# Patient Record
Sex: FEMALE | Race: WHITE | Employment: OTHER | URBAN - METROPOLITAN AREA
[De-identification: names, ages, dates, MRNs, and addresses within clinical notes are randomized per-mention and may not be internally consistent; named-entity substitution may affect disease eponyms.]

---

## 2017-05-25 PROBLEM — Z72.0 TOBACCO USE: Status: ACTIVE | Noted: 2017-05-25

## 2017-05-25 PROBLEM — Z12.39 BREAST CANCER SCREENING: Status: ACTIVE | Noted: 2017-05-25

## 2017-05-25 PROBLEM — I25.10 CORONARY ARTERY DISEASE INVOLVING NATIVE CORONARY ARTERY: Status: ACTIVE | Noted: 2017-05-25

## 2017-05-25 PROBLEM — H61.21 RIGHT EAR IMPACTED CERUMEN: Status: ACTIVE | Noted: 2017-05-25

## 2017-05-25 PROBLEM — Z12.11 COLON CANCER SCREENING: Status: ACTIVE | Noted: 2017-05-25

## 2017-05-25 PROBLEM — Z78.0 POST-MENOPAUSAL: Status: ACTIVE | Noted: 2017-05-25

## 2017-06-11 ENCOUNTER — APPOINTMENT (OUTPATIENT)
Dept: CT IMAGING | Age: 67
End: 2017-06-11
Attending: EMERGENCY MEDICINE
Payer: MEDICARE

## 2017-06-11 ENCOUNTER — HOSPITAL ENCOUNTER (EMERGENCY)
Age: 67
Discharge: HOME | End: 2017-06-11
Attending: EMERGENCY MEDICINE
Payer: MEDICARE

## 2017-06-11 VITALS
BODY MASS INDEX: 18.99 KG/M2 | HEIGHT: 67 IN | SYSTOLIC BLOOD PRESSURE: 154 MMHG | OXYGEN SATURATION: 94 % | DIASTOLIC BLOOD PRESSURE: 66 MMHG | WEIGHT: 121 LBS | HEART RATE: 80 BPM | RESPIRATION RATE: 18 BRPM | TEMPERATURE: 98.2 F

## 2017-06-11 DIAGNOSIS — R10.31 ABDOMINAL PAIN, RIGHT LOWER QUADRANT: Primary | ICD-10-CM

## 2017-06-11 LAB
ALBUMIN SERPL BCP-MCNC: 4 G/DL (ref 3.2–4.6)
ALBUMIN/GLOB SERPL: 1 {RATIO} (ref 1.2–3.5)
ALP SERPL-CCNC: 56 U/L (ref 50–136)
ALT SERPL-CCNC: 21 U/L (ref 12–65)
ANION GAP BLD CALC-SCNC: 10 MMOL/L (ref 7–16)
AST SERPL W P-5'-P-CCNC: 23 U/L (ref 15–37)
BILIRUB SERPL-MCNC: 0.3 MG/DL (ref 0.2–1.1)
BUN SERPL-MCNC: 12 MG/DL (ref 8–23)
CALCIUM SERPL-MCNC: 9.9 MG/DL (ref 8.3–10.4)
CHLORIDE SERPL-SCNC: 106 MMOL/L (ref 98–107)
CO2 SERPL-SCNC: 28 MMOL/L (ref 21–32)
CREAT SERPL-MCNC: 0.76 MG/DL (ref 0.6–1)
GLOBULIN SER CALC-MCNC: 4.2 G/DL (ref 2.3–3.5)
GLUCOSE SERPL-MCNC: 84 MG/DL (ref 65–100)
POTASSIUM SERPL-SCNC: 4.3 MMOL/L (ref 3.5–5.1)
PROT SERPL-MCNC: 8.2 G/DL (ref 6.3–8.2)
SODIUM SERPL-SCNC: 144 MMOL/L (ref 136–145)

## 2017-06-11 PROCEDURE — 80053 COMPREHEN METABOLIC PANEL: CPT | Performed by: EMERGENCY MEDICINE

## 2017-06-11 PROCEDURE — 74011000258 HC RX REV CODE- 258: Performed by: EMERGENCY MEDICINE

## 2017-06-11 PROCEDURE — 74177 CT ABD & PELVIS W/CONTRAST: CPT

## 2017-06-11 PROCEDURE — 74011636320 HC RX REV CODE- 636/320: Performed by: EMERGENCY MEDICINE

## 2017-06-11 PROCEDURE — 99284 EMERGENCY DEPT VISIT MOD MDM: CPT | Performed by: EMERGENCY MEDICINE

## 2017-06-11 RX ORDER — ONDANSETRON 4 MG/1
4 TABLET, ORALLY DISINTEGRATING ORAL
Qty: 20 TAB | Refills: 0 | Status: SHIPPED | OUTPATIENT
Start: 2017-06-11

## 2017-06-11 RX ORDER — OXYCODONE AND ACETAMINOPHEN 5; 325 MG/1; MG/1
1 TABLET ORAL
Qty: 10 TAB | Refills: 0 | Status: SHIPPED | OUTPATIENT
Start: 2017-06-11 | End: 2017-07-18

## 2017-06-11 RX ORDER — SODIUM CHLORIDE 0.9 % (FLUSH) 0.9 %
10 SYRINGE (ML) INJECTION
Status: COMPLETED | OUTPATIENT
Start: 2017-06-11 | End: 2017-06-11

## 2017-06-11 RX ADMIN — IOPAMIDOL 100 ML: 755 INJECTION, SOLUTION INTRAVENOUS at 15:46

## 2017-06-11 RX ADMIN — Medication 10 ML: at 15:46

## 2017-06-11 RX ADMIN — SODIUM CHLORIDE 100 ML: 900 INJECTION, SOLUTION INTRAVENOUS at 15:46

## 2017-06-11 RX ADMIN — DIATRIZOATE MEGLUMINE AND DIATRIZOATE SODIUM 30 ML: 600; 100 SOLUTION ORAL; RECTAL at 14:52

## 2017-06-11 NOTE — ED PROVIDER NOTES
HPI Comments: 75 yo female with no significant past medical history presents to ED with 5 days of RLQ abdominal pain, nausea, decreased appetite, and subjective fever. She also reports diarrhea x 1. She was seen at urgent care and sent to ED for evaluation. UA was negative and WBC with normal WBC at urgent care per records. She denies urinary symptoms. Previous abdominal surgery of tubal ligation. Patient is a 74 y.o. female presenting with abdominal pain. The history is provided by the patient and medical records. Abdominal Pain    Associated symptoms include a fever, diarrhea and nausea. Pertinent negatives include no dysuria, no hematuria, no arthralgias and no chest pain. History reviewed. No pertinent past medical history. Past Surgical History:   Procedure Laterality Date    HX HEART CATHETERIZATION      minimal coronary disease         Family History:   Problem Relation Age of Onset    COPD Mother     Cancer Father     Breast Cancer Sister     No Known Problems Brother     No Known Problems Brother     No Known Problems Sister        Social History     Social History    Marital status:      Spouse name: N/A    Number of children: N/A    Years of education: N/A     Occupational History    Not on file. Social History Main Topics    Smoking status: Current Every Day Smoker     Packs/day: 0.50     Types: Cigarettes    Smokeless tobacco: Not on file    Alcohol use No    Drug use: Not on file    Sexual activity: Yes     Partners: Male     Other Topics Concern    Not on file     Social History Narrative         ALLERGIES: Review of patient's allergies indicates no known allergies. Review of Systems   Constitutional: Positive for appetite change and fever. Negative for chills and fatigue. HENT: Negative for congestion, rhinorrhea and sore throat. Eyes: Negative for pain and discharge. Respiratory: Negative for chest tightness and shortness of breath. Cardiovascular: Negative for chest pain, palpitations and leg swelling. Gastrointestinal: Positive for abdominal pain, diarrhea and nausea. Endocrine: Negative for cold intolerance and heat intolerance. Genitourinary: Negative for dysuria, flank pain and hematuria. Musculoskeletal: Negative for arthralgias, joint swelling and neck stiffness. Skin: Negative for pallor and wound. Allergic/Immunologic: Negative for environmental allergies. Neurological: Negative for dizziness, syncope, speech difficulty, weakness, light-headedness and numbness. Hematological: Negative for adenopathy. Psychiatric/Behavioral: Negative for suicidal ideas. Vitals:    06/11/17 1357   BP: 125/57   Pulse: 72   Resp: 16   Temp: 98.2 °F (36.8 °C)   SpO2: 95%   Weight: 54.9 kg (121 lb)   Height: 5' 7\" (1.702 m)            Physical Exam   Constitutional: She is oriented to person, place, and time. She appears well-developed and well-nourished. No distress. HENT:   Head: Normocephalic and atraumatic. Eyes: Conjunctivae and EOM are normal.   Neck: Normal range of motion. Neck supple. No tracheal deviation present. Cardiovascular: Normal rate, regular rhythm and intact distal pulses. Pulmonary/Chest: Effort normal and breath sounds normal. No respiratory distress. Abdominal: Soft.   + tenderness with palpation of RLQ, no peritoneal signs   Musculoskeletal: Normal range of motion. She exhibits no edema or tenderness. Neurological: She is alert and oriented to person, place, and time. She has normal strength. No cranial nerve deficit or sensory deficit. Skin: Skin is warm and dry. She is not diaphoretic. Psychiatric: She has a normal mood and affect. MDM  Number of Diagnoses or Management Options  Diagnosis management comments: Labs unremarkable. CT A/P with evidence of appendicitis or acute abnormality. Patient resting comfortably, nontoxic appearing.   Plan for symptom control, outpatient follow up.    ED Course       Procedures

## 2017-06-11 NOTE — ED TRIAGE NOTES
Pt arrives complaining of right lower abdominal pain. States that she was seen at urgent care and was sent here for a CT for possible appendicitis. Pt states her urine was run and was found to be clean. Pt states this pain started last Tuesday. Pt states she's had diarrhea, denies vomiting. States she does have nausea. Pt alert and oriented in triage.

## 2017-06-11 NOTE — DISCHARGE INSTRUCTIONS

## 2017-06-13 PROBLEM — R10.31 RLQ ABDOMINAL PAIN: Status: ACTIVE | Noted: 2017-06-13

## 2017-06-27 ENCOUNTER — HOSPITAL ENCOUNTER (OUTPATIENT)
Dept: LAB | Age: 67
Discharge: HOME | End: 2017-06-27

## 2017-06-27 PROCEDURE — 88305 TISSUE EXAM BY PATHOLOGIST: CPT | Performed by: INTERNAL MEDICINE

## 2017-07-03 ENCOUNTER — HOSPITAL ENCOUNTER (OUTPATIENT)
Dept: ULTRASOUND IMAGING | Age: 67
Discharge: HOME | End: 2017-07-03
Attending: SURGERY
Payer: MEDICARE

## 2017-07-03 ENCOUNTER — HOSPITAL ENCOUNTER (OUTPATIENT)
Dept: NUCLEAR MEDICINE | Age: 67
Discharge: HOME | End: 2017-07-03
Attending: SURGERY
Payer: MEDICARE

## 2017-07-03 VITALS — WEIGHT: 119 LBS | HEIGHT: 67 IN | BODY MASS INDEX: 18.68 KG/M2

## 2017-07-03 DIAGNOSIS — R10.9 RIGHT SIDED ABDOMINAL PAIN: ICD-10-CM

## 2017-07-03 PROCEDURE — 78227 HEPATOBIL SYST IMAGE W/DRUG: CPT

## 2017-07-03 PROCEDURE — 74011250636 HC RX REV CODE- 250/636: Performed by: SURGERY

## 2017-07-03 PROCEDURE — 76705 ECHO EXAM OF ABDOMEN: CPT

## 2017-07-03 RX ADMIN — SINCALIDE 1.08 MCG: 5 INJECTION, POWDER, LYOPHILIZED, FOR SOLUTION INTRAVENOUS at 09:00

## 2017-07-18 PROBLEM — Z01.419 VISIT FOR PELVIC EXAM: Status: ACTIVE | Noted: 2017-07-18

## 2017-08-15 ENCOUNTER — HOSPITAL ENCOUNTER (OUTPATIENT)
Dept: MAMMOGRAPHY | Age: 67
Discharge: HOME | End: 2017-08-15
Attending: INTERNAL MEDICINE
Payer: MEDICARE

## 2017-08-15 DIAGNOSIS — Z12.39 BREAST CANCER SCREENING: ICD-10-CM

## 2017-08-15 DIAGNOSIS — Z78.0 POST-MENOPAUSAL: ICD-10-CM

## 2017-08-15 PROCEDURE — 77067 SCR MAMMO BI INCL CAD: CPT

## 2017-08-15 PROCEDURE — 77080 DXA BONE DENSITY AXIAL: CPT

## 2018-01-30 PROBLEM — J01.00 ACUTE NON-RECURRENT MAXILLARY SINUSITIS: Status: ACTIVE | Noted: 2018-01-30

## 2019-03-18 PROBLEM — R53.83 OTHER FATIGUE: Status: ACTIVE | Noted: 2019-03-18

## 2019-03-18 PROBLEM — K63.5 HYPERPLASTIC COLONIC POLYP: Status: ACTIVE | Noted: 2019-03-18

## 2019-03-18 PROBLEM — R63.5 WEIGHT GAIN: Status: ACTIVE | Noted: 2019-03-18

## 2019-03-18 PROBLEM — G47.9 SLEEP DISTURBANCE: Status: ACTIVE | Noted: 2019-03-18

## 2019-07-19 ENCOUNTER — HOSPITAL ENCOUNTER (OUTPATIENT)
Dept: MAMMOGRAPHY | Age: 69
Discharge: HOME | End: 2019-07-19
Attending: INTERNAL MEDICINE
Payer: MEDICARE

## 2019-07-19 DIAGNOSIS — Z12.39 BREAST CANCER SCREENING: ICD-10-CM

## 2019-07-19 PROCEDURE — 77067 SCR MAMMO BI INCL CAD: CPT

## 2019-09-25 PROBLEM — Z01.419 VISIT FOR PELVIC EXAM: Status: RESOLVED | Noted: 2017-07-18 | Resolved: 2019-09-25

## 2020-01-28 PROBLEM — R06.02 SOB (SHORTNESS OF BREATH): Status: ACTIVE | Noted: 2020-01-28

## 2020-01-28 PROBLEM — M54.16 LUMBAR RADICULOPATHY: Status: ACTIVE | Noted: 2020-01-28

## 2020-01-28 PROBLEM — R63.4 WEIGHT LOSS: Status: ACTIVE | Noted: 2020-01-28

## 2020-03-02 ENCOUNTER — HOSPITAL ENCOUNTER (OUTPATIENT)
Dept: MRI IMAGING | Age: 70
Discharge: HOME | End: 2020-03-02
Attending: INTERNAL MEDICINE
Payer: MEDICARE

## 2020-03-02 DIAGNOSIS — M51.36 DDD (DEGENERATIVE DISC DISEASE), LUMBAR: ICD-10-CM

## 2020-03-02 PROCEDURE — 72148 MRI LUMBAR SPINE W/O DYE: CPT

## 2020-03-03 NOTE — PROGRESS NOTES
MRI confirms severe spinal stenosis.  Pt needs referral to see if she is a candidate for [facility] ( Ortho, or Neurosurgery, or Pain Management) wherever she can get in the soonest

## 2020-03-05 NOTE — PROGRESS NOTES
Pt notified per Dr Bri Zamorano review below. REferral to Ortho done.  Pt to call if she does not hear

## 2021-05-12 LAB — MAMMOGRAPHY, EXTERNAL: NORMAL

## 2022-03-18 PROBLEM — H61.21 RIGHT EAR IMPACTED CERUMEN: Status: ACTIVE | Noted: 2017-05-25

## 2022-03-18 PROBLEM — R53.83 OTHER FATIGUE: Status: ACTIVE | Noted: 2019-03-18

## 2022-03-19 PROBLEM — R63.4 WEIGHT LOSS: Status: ACTIVE | Noted: 2020-01-28

## 2022-03-19 PROBLEM — R63.5 WEIGHT GAIN: Status: ACTIVE | Noted: 2019-03-18

## 2022-03-19 PROBLEM — J01.00 ACUTE NON-RECURRENT MAXILLARY SINUSITIS: Status: ACTIVE | Noted: 2018-01-30

## 2022-03-19 PROBLEM — Z78.0 POST-MENOPAUSAL: Status: ACTIVE | Noted: 2017-05-25

## 2022-03-19 PROBLEM — M54.16 LUMBAR RADICULOPATHY: Status: ACTIVE | Noted: 2020-01-28

## 2022-03-19 PROBLEM — Z12.11 COLON CANCER SCREENING: Status: ACTIVE | Noted: 2017-05-25

## 2022-03-19 PROBLEM — I25.10 CORONARY ARTERY DISEASE INVOLVING NATIVE CORONARY ARTERY: Status: ACTIVE | Noted: 2017-05-25

## 2022-03-19 PROBLEM — R06.02 SOB (SHORTNESS OF BREATH): Status: ACTIVE | Noted: 2020-01-28

## 2022-03-19 PROBLEM — K63.5 HYPERPLASTIC COLONIC POLYP: Status: ACTIVE | Noted: 2019-03-18

## 2022-03-19 PROBLEM — Z72.0 TOBACCO USE: Status: ACTIVE | Noted: 2017-05-25

## 2022-03-20 PROBLEM — G47.9 SLEEP DISTURBANCE: Status: ACTIVE | Noted: 2019-03-18

## 2022-03-20 PROBLEM — Z12.39 BREAST CANCER SCREENING: Status: ACTIVE | Noted: 2017-05-25

## 2022-03-20 PROBLEM — R10.31 RLQ ABDOMINAL PAIN: Status: ACTIVE | Noted: 2017-06-13

## 2023-02-21 ENCOUNTER — OFFICE VISIT (OUTPATIENT)
Dept: INTERNAL MEDICINE CLINIC | Facility: CLINIC | Age: 73
End: 2023-02-21
Payer: MEDICARE

## 2023-02-21 VITALS
HEIGHT: 67 IN | BODY MASS INDEX: 17.55 KG/M2 | OXYGEN SATURATION: 96 % | WEIGHT: 111.8 LBS | HEART RATE: 92 BPM | DIASTOLIC BLOOD PRESSURE: 62 MMHG | SYSTOLIC BLOOD PRESSURE: 100 MMHG

## 2023-02-21 DIAGNOSIS — Z11.9 SCREENING EXAMINATION FOR INFECTIOUS DISEASE: ICD-10-CM

## 2023-02-21 DIAGNOSIS — M81.0 AGE-RELATED OSTEOPOROSIS WITHOUT CURRENT PATHOLOGICAL FRACTURE: ICD-10-CM

## 2023-02-21 DIAGNOSIS — Z78.0 POSTMENOPAUSAL: ICD-10-CM

## 2023-02-21 DIAGNOSIS — R91.1 PULMONARY NODULE: ICD-10-CM

## 2023-02-21 DIAGNOSIS — J42 CHRONIC BRONCHITIS, UNSPECIFIED CHRONIC BRONCHITIS TYPE (HCC): Primary | ICD-10-CM

## 2023-02-21 DIAGNOSIS — I25.10 CORONARY ARTERY DISEASE INVOLVING NATIVE CORONARY ARTERY OF NATIVE HEART WITHOUT ANGINA PECTORIS: ICD-10-CM

## 2023-02-21 DIAGNOSIS — Z72.0 TOBACCO USE: ICD-10-CM

## 2023-02-21 DIAGNOSIS — K63.5 HYPERPLASTIC COLONIC POLYP, UNSPECIFIED PART OF COLON: ICD-10-CM

## 2023-02-21 DIAGNOSIS — E78.2 MIXED HYPERLIPIDEMIA: ICD-10-CM

## 2023-02-21 PROBLEM — R06.02 SOB (SHORTNESS OF BREATH): Status: RESOLVED | Noted: 2020-01-28 | Resolved: 2023-02-21

## 2023-02-21 PROBLEM — M54.16 LUMBAR RADICULOPATHY: Status: ACTIVE | Noted: 2020-01-28

## 2023-02-21 PROBLEM — Z12.39 BREAST CANCER SCREENING: Status: RESOLVED | Noted: 2017-05-25 | Resolved: 2023-02-21

## 2023-02-21 PROBLEM — J44.9 COPD (CHRONIC OBSTRUCTIVE PULMONARY DISEASE) (HCC): Status: ACTIVE | Noted: 2020-06-23

## 2023-02-21 PROBLEM — Z12.11 COLON CANCER SCREENING: Status: RESOLVED | Noted: 2017-05-25 | Resolved: 2023-02-21

## 2023-02-21 PROBLEM — R10.31 RLQ ABDOMINAL PAIN: Status: RESOLVED | Noted: 2017-06-13 | Resolved: 2023-02-21

## 2023-02-21 PROBLEM — R53.83 OTHER FATIGUE: Status: RESOLVED | Noted: 2019-03-18 | Resolved: 2023-02-21

## 2023-02-21 PROBLEM — R63.4 WEIGHT LOSS: Status: RESOLVED | Noted: 2020-01-28 | Resolved: 2023-02-21

## 2023-02-21 PROBLEM — M54.16 LUMBAR RADICULOPATHY: Status: RESOLVED | Noted: 2020-01-28 | Resolved: 2023-02-21

## 2023-02-21 PROBLEM — J01.00 ACUTE NON-RECURRENT MAXILLARY SINUSITIS: Status: RESOLVED | Noted: 2018-01-30 | Resolved: 2023-02-21

## 2023-02-21 PROBLEM — R63.5 WEIGHT GAIN: Status: RESOLVED | Noted: 2019-03-18 | Resolved: 2023-02-21

## 2023-02-21 PROBLEM — H61.21 RIGHT EAR IMPACTED CERUMEN: Status: RESOLVED | Noted: 2017-05-25 | Resolved: 2023-02-21

## 2023-02-21 LAB
25(OH)D3 SERPL-MCNC: 25.6 NG/ML (ref 30–100)
ALBUMIN SERPL-MCNC: 4.2 G/DL (ref 3.2–4.6)
ALBUMIN/GLOB SERPL: 1.4 (ref 0.4–1.6)
ALP SERPL-CCNC: 47 U/L (ref 50–136)
ALT SERPL-CCNC: 15 U/L (ref 12–65)
ANION GAP SERPL CALC-SCNC: 3 MMOL/L (ref 2–11)
AST SERPL-CCNC: 20 U/L (ref 15–37)
BASOPHILS # BLD: 0.1 K/UL (ref 0–0.2)
BASOPHILS NFR BLD: 1 % (ref 0–2)
BILIRUB SERPL-MCNC: 0.4 MG/DL (ref 0.2–1.1)
BUN SERPL-MCNC: 23 MG/DL (ref 8–23)
CALCIUM SERPL-MCNC: 10 MG/DL (ref 8.3–10.4)
CHLORIDE SERPL-SCNC: 107 MMOL/L (ref 101–110)
CHOLEST SERPL-MCNC: 278 MG/DL
CO2 SERPL-SCNC: 26 MMOL/L (ref 21–32)
CREAT SERPL-MCNC: 0.9 MG/DL (ref 0.6–1)
DIFFERENTIAL METHOD BLD: NORMAL
EOSINOPHIL # BLD: 0.1 K/UL (ref 0–0.8)
EOSINOPHIL NFR BLD: 1 % (ref 0.5–7.8)
ERYTHROCYTE [DISTWIDTH] IN BLOOD BY AUTOMATED COUNT: 14 % (ref 11.9–14.6)
GLOBULIN SER CALC-MCNC: 3 G/DL (ref 2.8–4.5)
GLUCOSE SERPL-MCNC: 97 MG/DL (ref 65–100)
HCT VFR BLD AUTO: 39.9 % (ref 35.8–46.3)
HDLC SERPL-MCNC: 99 MG/DL (ref 40–60)
HDLC SERPL: 2.8
HGB BLD-MCNC: 12.8 G/DL (ref 11.7–15.4)
IMM GRANULOCYTES # BLD AUTO: 0 K/UL (ref 0–0.5)
IMM GRANULOCYTES NFR BLD AUTO: 0 % (ref 0–5)
LDLC SERPL CALC-MCNC: 158 MG/DL
LYMPHOCYTES # BLD: 1.6 K/UL (ref 0.5–4.6)
LYMPHOCYTES NFR BLD: 21 % (ref 13–44)
MCH RBC QN AUTO: 29.7 PG (ref 26.1–32.9)
MCHC RBC AUTO-ENTMCNC: 32.1 G/DL (ref 31.4–35)
MCV RBC AUTO: 92.6 FL (ref 82–102)
MONOCYTES # BLD: 0.4 K/UL (ref 0.1–1.3)
MONOCYTES NFR BLD: 5 % (ref 4–12)
NEUTS SEG # BLD: 5.4 K/UL (ref 1.7–8.2)
NEUTS SEG NFR BLD: 72 % (ref 43–78)
NRBC # BLD: 0 K/UL (ref 0–0.2)
PLATELET # BLD AUTO: 306 K/UL (ref 150–450)
PMV BLD AUTO: 10.9 FL (ref 9.4–12.3)
POTASSIUM SERPL-SCNC: 4.4 MMOL/L (ref 3.5–5.1)
PROT SERPL-MCNC: 7.2 G/DL (ref 6.3–8.2)
RBC # BLD AUTO: 4.31 M/UL (ref 4.05–5.2)
SODIUM SERPL-SCNC: 136 MMOL/L (ref 133–143)
TRIGL SERPL-MCNC: 105 MG/DL (ref 35–150)
TSH W FREE THYROID IF ABNORMAL: 2.57 UIU/ML (ref 0.36–3.74)
VLDLC SERPL CALC-MCNC: 21 MG/DL (ref 6–23)
WBC # BLD AUTO: 7.6 K/UL (ref 4.3–11.1)

## 2023-02-21 PROCEDURE — G8399 PT W/DXA RESULTS DOCUMENT: HCPCS | Performed by: INTERNAL MEDICINE

## 2023-02-21 PROCEDURE — 3023F SPIROM DOC REV: CPT | Performed by: INTERNAL MEDICINE

## 2023-02-21 PROCEDURE — 1090F PRES/ABSN URINE INCON ASSESS: CPT | Performed by: INTERNAL MEDICINE

## 2023-02-21 PROCEDURE — G8427 DOCREV CUR MEDS BY ELIG CLIN: HCPCS | Performed by: INTERNAL MEDICINE

## 2023-02-21 PROCEDURE — G8484 FLU IMMUNIZE NO ADMIN: HCPCS | Performed by: INTERNAL MEDICINE

## 2023-02-21 PROCEDURE — 99406 BEHAV CHNG SMOKING 3-10 MIN: CPT | Performed by: INTERNAL MEDICINE

## 2023-02-21 PROCEDURE — 4004F PT TOBACCO SCREEN RCVD TLK: CPT | Performed by: INTERNAL MEDICINE

## 2023-02-21 PROCEDURE — 1123F ACP DISCUSS/DSCN MKR DOCD: CPT | Performed by: INTERNAL MEDICINE

## 2023-02-21 PROCEDURE — 3017F COLORECTAL CA SCREEN DOC REV: CPT | Performed by: INTERNAL MEDICINE

## 2023-02-21 PROCEDURE — G8419 CALC BMI OUT NRM PARAM NOF/U: HCPCS | Performed by: INTERNAL MEDICINE

## 2023-02-21 PROCEDURE — 99204 OFFICE O/P NEW MOD 45 MIN: CPT | Performed by: INTERNAL MEDICINE

## 2023-02-21 RX ORDER — ALBUTEROL SULFATE 90 UG/1
1 AEROSOL, METERED RESPIRATORY (INHALATION) EVERY 6 HOURS PRN
Qty: 3 EACH | Refills: 2 | Status: SHIPPED | OUTPATIENT
Start: 2023-02-21

## 2023-02-21 ASSESSMENT — ENCOUNTER SYMPTOMS
COUGH: 0
ABDOMINAL PAIN: 0
SHORTNESS OF BREATH: 0
CHEST TIGHTNESS: 0
ABDOMINAL DISTENTION: 0
CONSTIPATION: 0

## 2023-02-21 ASSESSMENT — PATIENT HEALTH QUESTIONNAIRE - PHQ9
SUM OF ALL RESPONSES TO PHQ9 QUESTIONS 1 & 2: 0
1. LITTLE INTEREST OR PLEASURE IN DOING THINGS: 0
2. FEELING DOWN, DEPRESSED OR HOPELESS: 0
SUM OF ALL RESPONSES TO PHQ QUESTIONS 1-9: 0

## 2023-02-21 ASSESSMENT — ANXIETY QUESTIONNAIRES
3. WORRYING TOO MUCH ABOUT DIFFERENT THINGS: 0
1. FEELING NERVOUS, ANXIOUS, OR ON EDGE: 0
7. FEELING AFRAID AS IF SOMETHING AWFUL MIGHT HAPPEN: 0
4. TROUBLE RELAXING: 0
GAD7 TOTAL SCORE: 0
6. BECOMING EASILY ANNOYED OR IRRITABLE: 0
5. BEING SO RESTLESS THAT IT IS HARD TO SIT STILL: 0
2. NOT BEING ABLE TO STOP OR CONTROL WORRYING: 0

## 2023-02-21 NOTE — PROGRESS NOTES
Chief Complaint   Patient presents with    Establish Care     Last AWV  1/13/23. Daniel Ramos is a 81 y.o. female who presents today for Establish Care (Last AWV  1/13/23. )     Patient is here to [facility], she lives with her , had 7 kids, but unfortunately 1 passed away. She is originally from [place], has been living in [place] for at least 15 years, few her kids live in [place]  She reports being active at home, she likes to use a treadmill,  Continues to smoke about 1 pack it can last over a month. Her providers are mainly with [facility], but some of them had left the practice so she decided to change primary care    Getting a mammogram coming up in the next 2 months  Due for bone density, has a history of osteoporosis not on current treatment    She has opted in for screening for lung cancer, the last CT scan was showing a new 5 mm nodule, and is having a follow-up scan in April. Currently due for bone density    History of COPD using her inhalers as prescribed, last time she has used the albuterol was couple months ago    Reports compliance    Per her chart she has history of coronary artery disease, but was found to have nonocclusive coronary artery disease,        Wt Readings from Last 3 Encounters:   02/21/23 111 lb 12.8 oz (50.7 kg)     Vitals:    02/21/23 1052   BP: 100/62   Site: Left Upper Arm   Position: Sitting   Pulse: 92   SpO2: 96%   Weight: 111 lb 12.8 oz (50.7 kg)   Height: 5' 7\" (1.702 m)        Assessment and plan:  1. Chronic bronchitis, unspecified chronic bronchitis type ([address]  Assessment & Plan:   At goal, continue current medications, medication adherence emphasized and smoking cesation   Orders:  -     albuterol sulfate HFA (PROVENTIL;VENTOLIN;PROAIR) 108 (90 Base) MCG/ACT inhaler;  Inhale 1 puff into the lungs every 6 hours as needed for Wheezing INHALE 1 PUFF BY MOUTH EVERY 6 HOURS AS NEEDED FOR WHEEZING, Disp-3 each, R-2Normal  - tiotropium-olodaterol (STIOLTO) 2.5-2.5 MCG/ACT AERS; Inhale 2 puffs into the lungs daily, Disp-3 each, R-2Normal  2. Postmenopausal  -     DEXA BONE DENSITY AXIAL SKELETON; Future  -     Vitamin D 25 Hydroxy; Future  3. Tobacco use  Assessment & Plan:     Tobacco cessation was discussed with patient. Patient was instructed about harms of smoking, benefits of quitting. Possible alternatives like counseling, coaching programs, support of friends and family, and/or medications  At least 3-5 minutes spend in counseling. 4. Pulmonary nodule  Assessment & Plan:   Asymptomatic, CT pending ,  5. Age-related osteoporosis without current pathological fracture  -     Vitamin D 25 Hydroxy; Future  6. Mixed hyperlipidemia  -     CBC with Auto Differential; Future  -     Comprehensive Metabolic Panel; Future  -     Lipid Panel; Future  -     TSH with Reflex; Future  7. Screening examination for infectious disease  -     Hepatitis C Ab, Rflx to Qt by PCR; Future  8. Coronary artery disease involving native coronary artery of native heart without angina pectoris  Assessment & Plan:   Asymptomatic, lifestyle modifications recommended and smoking cessation  9. Hyperplastic colonic polyp, unspecified part of colon  Assessment & Plan:   Asymptomatic, next colonsocopy 2027  Records available in care everywhere were reviewed, more than 30 to 40 minutes were spent, reviewing and updating current chart. Return in about 6 months (around 8/21/2023). Review of system:    Review of Systems   Constitutional:  Negative for activity change, chills, fatigue and fever. Respiratory:  Negative for cough, chest tightness and shortness of breath. Cardiovascular:  Negative for chest pain. Gastrointestinal:  Negative for abdominal distention, abdominal pain and constipation. Neurological:  Negative for dizziness and headaches. Psychiatric/Behavioral:  The patient is not nervous/anxious.         Immunization history:    Immunization History   Administered Date(s) Administered    COVID-19, J&J, (age 18y+), IM, 0.5 mL 03/08/2021, 11/10/2021    Influenza, FLUZONE (age 72 y+), High Dose, 0.7mL 10/15/2020, 03/08/2022, 11/04/2022    Influenza, High Dose (Fluzone 65 yrs and older) 11/18/2018    Influenza, Triv, inactivated, subunit, adjuvanted, IM (Fluad 65 yrs and older) 11/11/2019       Current medications:      Current Outpatient Medications:     Cholecalciferol 50 MCG (2000 UT) CAPS, Take by mouth daily, Disp: , Rfl:     albuterol sulfate HFA (PROVENTIL;VENTOLIN;PROAIR) 108 (90 Base) MCG/ACT inhaler, Inhale 1 puff into the lungs every 6 hours as needed for Wheezing INHALE 1 PUFF BY MOUTH EVERY 6 HOURS AS NEEDED FOR WHEEZING, Disp: 3 each, Rfl: 2    tiotropium-olodaterol (STIOLTO) 2.5-2.5 MCG/ACT AERS, Inhale 2 puffs into the lungs daily, Disp: 3 each, Rfl: 2      Family history:    Family History   Problem Relation Age of Onset    COPD Mother     Cancer Father     Breast Cancer Sister 36    No Known Problems Brother     No Known Problems Brother     No Known Problems Sister         Past medical history:    Past Medical History:   Diagnosis Date    CAD (coronary artery disease)     COPD (chronic obstructive pulmonary disease) ([facility] [address]     Osteoporosis 2017    DEXA scan [facility] T score -2.5 at hip. Past Surgical History:   Procedure Laterality Date    BREAST BIOPSY Bilateral     CARDIAC CATHETERIZATION      minimal coronary disease    COLONOSCOPY  2017        Physical exam:    /62 (Site: Left Upper Arm, Position: Sitting)   Pulse 92   Ht 5' 7\" (1.702 m)   Wt 111 lb 12.8 oz (50.7 kg)   LMP  (LMP Unknown)   SpO2 96%   BMI 17.51 kg/m²     Physical Exam  Vitals reviewed. Constitutional:       Appearance: Normal appearance. HENT:      Head: Normocephalic and atraumatic. Cardiovascular:      Rate and Rhythm: Normal rate and regular rhythm.    Pulmonary:      Effort: Pulmonary effort is normal.      Breath sounds: Normal breath sounds. Abdominal:      Palpations: Abdomen is soft. Neurological:      General: No focal deficit present. Mental Status: She is alert and oriented to person, place, and time. Psychiatric:         Mood and Affect: Mood normal.         Behavior: Behavior normal.        Recent labs:    Hemoglobin A1C   Date Value Ref Range Status   01/28/2020 5.4 4.8 - 5.6 % Final     Comment:              Prediabetes: 5.7 - 6.4           Diabetes: >6.4           Glycemic control for adults with diabetes: <7.0          Lab Results   Component Value Date    CHOL 241 (H) 01/28/2020     Lab Results   Component Value Date    TRIG 110 01/28/2020     Lab Results   Component Value Date    HDL 84 01/28/2020     Lab Results   Component Value Date    LDLCALC 135 (H) 01/28/2020     Lab Results   Component Value Date    LABVLDL 22 01/28/2020     No results found for: [facility]    Lab Results   Component Value Date    TSH 2.810 01/28/2020       Lab Results   Component Value Date     (H) 01/28/2020    K 5.0 01/28/2020     01/28/2020    CO2 23 01/28/2020    BUN 9 01/28/2020    CREATININE 0.81 01/28/2020    GLUCOSE 92 01/28/2020    CALCIUM 10.7 (H) 01/28/2020    PROT 7.2 01/28/2020    LABALBU 4.5 01/28/2020    BILITOT <0.2 01/28/2020    ALKPHOS 56 01/28/2020    AST 22 01/28/2020    ALT 8 01/28/2020    GFRAA 87 01/28/2020    AGRATIO 1.7 01/28/2020       Lab Results   Component Value Date    WBC 8.0 01/28/2020    HGB 12.0 01/28/2020    HCT 34.5 01/28/2020    MCV 85 01/28/2020     01/28/2020             This document was generated with the aid of voice recognition software. . Please be aware that there may be inadvertent transcription errors not identified and corrected by the [employer]

## 2023-02-23 LAB
HCV AB SERPL QL IA: NORMAL
HCV IGG SERPL QL IA: NON REACTIVE

## 2023-03-27 DIAGNOSIS — Z12.31 SCREENING MAMMOGRAM FOR BREAST CANCER: Primary | ICD-10-CM

## 2023-03-31 DIAGNOSIS — Z12.31 SCREENING MAMMOGRAM FOR BREAST CANCER: ICD-10-CM

## 2023-11-05 DIAGNOSIS — J42 CHRONIC BRONCHITIS, UNSPECIFIED CHRONIC BRONCHITIS TYPE (HCC): ICD-10-CM

## 2023-11-06 RX ORDER — TIOTROPIUM BROMIDE AND OLODATEROL 3.124; 2.736 UG/1; UG/1
2 SPRAY, METERED RESPIRATORY (INHALATION) DAILY
Qty: 12 G | Refills: 10 | OUTPATIENT
Start: 2023-11-06